# Patient Record
Sex: MALE | Race: WHITE | NOT HISPANIC OR LATINO | Employment: OTHER | URBAN - METROPOLITAN AREA
[De-identification: names, ages, dates, MRNs, and addresses within clinical notes are randomized per-mention and may not be internally consistent; named-entity substitution may affect disease eponyms.]

---

## 2018-07-02 ENCOUNTER — TRANSCRIBE ORDERS (OUTPATIENT)
Dept: ADMINISTRATIVE | Facility: HOSPITAL | Age: 83
End: 2018-07-02

## 2018-07-02 DIAGNOSIS — F03.90 SENILE DEMENTIA, UNCOMPLICATED (HCC): Primary | ICD-10-CM

## 2018-07-19 ENCOUNTER — HOSPITAL ENCOUNTER (OUTPATIENT)
Dept: MRI IMAGING | Facility: HOSPITAL | Age: 83
Discharge: HOME | End: 2018-07-19
Payer: MEDICARE

## 2018-07-19 DIAGNOSIS — F03.90 SENILE DEMENTIA, UNCOMPLICATED (HCC): ICD-10-CM

## 2018-07-19 PROCEDURE — 70551 MRI BRAIN STEM W/O DYE: CPT

## 2019-10-17 ENCOUNTER — NURSING HOME VISIT (OUTPATIENT)
Dept: GERIATRICS | Facility: OTHER | Age: 84
End: 2019-10-17
Payer: MEDICARE

## 2019-10-17 DIAGNOSIS — F03.90 DEMENTIA WITHOUT BEHAVIORAL DISTURBANCE, UNSPECIFIED DEMENTIA TYPE (HCC): Primary | ICD-10-CM

## 2019-10-17 DIAGNOSIS — E03.9 HYPOTHYROIDISM, UNSPECIFIED TYPE: ICD-10-CM

## 2019-10-17 DIAGNOSIS — I10 ESSENTIAL HYPERTENSION: ICD-10-CM

## 2019-10-17 PROBLEM — N40.0 BPH (BENIGN PROSTATIC HYPERPLASIA): Status: ACTIVE | Noted: 2019-10-17

## 2019-10-17 PROCEDURE — 99336 PR DOM/R-HOME E/M EST PT MOD HI SEVERITY 40 MINUTES: CPT | Performed by: NURSE PRACTITIONER

## 2019-10-17 NOTE — ASSESSMENT & PLAN NOTE
Continue supportive care, redirection, reorientation new, assist with ADL's, fall precautions, donepezil, memantine

## 2019-10-17 NOTE — PROGRESS NOTES
[facility]  [address]  [phone]  [employer]           NAME: Garfield Abarca  AGE: 80 y o  SEX: male    DATE OF ENCOUNTER: 10/17/2019    Assessment and Plan     Dementia ([address] )  Continue supportive care, redirection, reorientation new, assist with ADL's, fall precautions, donepezil, memantine  Essential hypertension  Controlled  Continue aspirin, metoprolol  Monitor blood pressures  Hypothyroidism  Continue levothyroxine 50 mcg daily  Monitor TSH levels  BPH (benign prostatic hyperplasia)  Continue tamsulosin  Chief Complaint     Progress Note    History of Present Illness     Resident seen and examined at bedside  He is in stable condition  Unable to obtain history from patient secondary to dementia  He ambulates without assistance  There have been no reported recent falls  Nurse reports patient states room most the time except when he comes out for meals  He denies chest pain, shortness of breath, abdominal pain, nausea, headache, dizziness, pain  His oral intake is adequate  Blood pressures have been controlled  There have been no episodes of hypotension  Medications reviewed  Review of Systems     ROS as per noted in HPI  Objective     Vitals:  Blood pressure 120/69, pulse 60, respirations 16, temperature 97 4°  Pulse ox 99% on room air  Physical Exam   Constitutional: He appears well-developed and well-nourished  HENT:   Head: Normocephalic  Eyes: EOM are normal    Neck: Neck supple  Cardiovascular: Normal rate and normal heart sounds  Exam reveals no gallop and no friction rub  No murmur heard  Pulmonary/Chest: Effort normal and breath sounds normal  He has no wheezes  He has no rales  Abdominal: Soft  Bowel sounds are normal  He exhibits no distension  There is no tenderness  There is no rebound and no guarding  Colostomy intact  Musculoskeletal: Normal range of motion  He exhibits no edema     Neurological: He is alert  Resident is oriented to self  He is disoriented to place and time  Skin: Skin is warm and dry  Nursing note and vitals reviewed  Current Medications   Medications were reviewed and updated in facility chart       SERAFIN Armijo  10/17/2019 1:40 PM